# Patient Record
Sex: MALE | Race: WHITE | NOT HISPANIC OR LATINO | Employment: FULL TIME | URBAN - METROPOLITAN AREA
[De-identification: names, ages, dates, MRNs, and addresses within clinical notes are randomized per-mention and may not be internally consistent; named-entity substitution may affect disease eponyms.]

---

## 2023-02-10 ENCOUNTER — OFFICE VISIT (OUTPATIENT)
Dept: FAMILY MEDICINE | Facility: CLINIC | Age: 33
End: 2023-02-10
Payer: COMMERCIAL

## 2023-02-10 VITALS
TEMPERATURE: 98.1 F | BODY MASS INDEX: 27.98 KG/M2 | HEART RATE: 100 BPM | RESPIRATION RATE: 18 BRPM | WEIGHT: 225 LBS | OXYGEN SATURATION: 98 % | DIASTOLIC BLOOD PRESSURE: 72 MMHG | HEIGHT: 75 IN | SYSTOLIC BLOOD PRESSURE: 126 MMHG

## 2023-02-10 DIAGNOSIS — Z00.00 ROUTINE HISTORY AND PHYSICAL EXAMINATION OF ADULT: Primary | ICD-10-CM

## 2023-02-10 DIAGNOSIS — Z83.3 FAMILY HISTORY OF DIABETES MELLITUS: ICD-10-CM

## 2023-02-10 DIAGNOSIS — K13.79 LUMP IN MOUTH: ICD-10-CM

## 2023-02-10 PROBLEM — E66.9 OBESITY: Status: ACTIVE | Noted: 2023-02-10

## 2023-02-10 PROBLEM — J32.9 RECURRENT SINUSITIS: Status: ACTIVE | Noted: 2023-02-10

## 2023-02-10 LAB
ANION GAP SERPL CALCULATED.3IONS-SCNC: 10 MMOL/L (ref 7–15)
BUN SERPL-MCNC: 13.3 MG/DL (ref 6–20)
CALCIUM SERPL-MCNC: 9.7 MG/DL (ref 8.6–10)
CHLORIDE SERPL-SCNC: 103 MMOL/L (ref 98–107)
CHOLEST SERPL-MCNC: 169 MG/DL
CREAT SERPL-MCNC: 0.96 MG/DL (ref 0.67–1.17)
DEPRECATED HCO3 PLAS-SCNC: 27 MMOL/L (ref 22–29)
GFR SERPL CREATININE-BSD FRML MDRD: >90 ML/MIN/1.73M2
GLUCOSE SERPL-MCNC: 105 MG/DL (ref 70–99)
HBA1C MFR BLD: 5.1 % (ref 0–5.6)
HDLC SERPL-MCNC: 44 MG/DL
LDLC SERPL CALC-MCNC: 111 MG/DL
NONHDLC SERPL-MCNC: 125 MG/DL
POTASSIUM SERPL-SCNC: 4.9 MMOL/L (ref 3.4–5.3)
SODIUM SERPL-SCNC: 140 MMOL/L (ref 136–145)
TRIGL SERPL-MCNC: 68 MG/DL

## 2023-02-10 PROCEDURE — 80048 BASIC METABOLIC PNL TOTAL CA: CPT

## 2023-02-10 PROCEDURE — 80061 LIPID PANEL: CPT

## 2023-02-10 PROCEDURE — 36415 COLL VENOUS BLD VENIPUNCTURE: CPT

## 2023-02-10 PROCEDURE — 99203 OFFICE O/P NEW LOW 30 MIN: CPT | Mod: 25

## 2023-02-10 PROCEDURE — 83036 HEMOGLOBIN GLYCOSYLATED A1C: CPT

## 2023-02-10 PROCEDURE — 99385 PREV VISIT NEW AGE 18-39: CPT

## 2023-02-10 RX ORDER — FLUTICASONE PROPIONATE 50 MCG
1 SPRAY, SUSPENSION (ML) NASAL PRN
COMMUNITY

## 2023-02-10 ASSESSMENT — ENCOUNTER SYMPTOMS
COUGH: 0
EYE PAIN: 0
ARTHRALGIAS: 0
SHORTNESS OF BREATH: 0
ABDOMINAL PAIN: 0
WEAKNESS: 0
FREQUENCY: 0
NERVOUS/ANXIOUS: 1
PALPITATIONS: 0
CHILLS: 0
FEVER: 0
MYALGIAS: 0
HEMATURIA: 0
HEMATOCHEZIA: 0
HEADACHES: 0
DIARRHEA: 0
PARESTHESIAS: 0
CONSTIPATION: 0
DIZZINESS: 0
JOINT SWELLING: 0
HEARTBURN: 0
SORE THROAT: 0
DYSURIA: 0
NAUSEA: 0

## 2023-02-10 NOTE — PROGRESS NOTES
"   SUBJECTIVE:   CC: Sal is an 32 year old who presents for preventive health visit.     Patient has been advised of split billing requirements and indicates understanding: Yes  Krunal is a 32-year-old male ambulatory to clinic accompanied by self.  He is here for a physical and with concerns of lump under tongue. Lump under tongue for 1-2 years approx 3mm.  He did see a dentist for evaluation of the lump in [place], and was referred to oral surgery for biopsy.  His appointment is for end of February per his report.  No pain, no pus. No cigarettes, vape or chewing tobacco use, occasional cigar smoker. Leaves for [place] Monday. No pain, no swelling.   Had covid twice Aug 2021 and July 2022. Was able to treat at home during both illnesses.  Most bothersome symptom was severe sore throat. 8 months ago noticed he is occasionally unable to remember names, was more frequent at first but is less as time goes on.    in [place], active hiking, no chronic illness, does not take any medications. He has unilateral congenital absence of kidney.  He does have a history of an episode of hematuria that was evaluated with no known cause.  He denies any urinary concerns today.    Healthy Habits:     Getting at least 3 servings of Calcium per day:  Yes    Bi-annual eye exam:  NO    Dental care twice a year:  NO    Sleep apnea or symptoms of sleep apnea:  None    Diet:  Regular (no restrictions)    Frequency of exercise:  2-3 days/week    Duration of exercise:  30-45 minutes    Taking medications regularly:  Yes    Medication side effects:  None    PHQ-2 Total Score: 0    Additional concerns today:  Yes    Does endorse a \"sweet tooth\" and likes baked goods.     Onset: Unsure when he first noticed it.  Description: Lump on the floor of his mouth under tongue, unknown if it is benign.  Dentist advised he see an oral surgeon.  Just wants a quick look.      Today's PHQ-2 Score:   PHQ-2 ( 1999 [employer]) 2/10/2023   Q1: Little " "interest or pleasure in doing things 0   Q2: Feeling down, depressed or hopeless 0   PHQ-2 Score 0   Q1: Little interest or pleasure in doing things Not at all   Q2: Feeling down, depressed or hopeless Not at all   PHQ-2 Score 0       Social History     Tobacco Use     Smoking status: Never     Smokeless tobacco: Never   Substance Use Topics     Alcohol use: Not on file       Alcohol Use 2/10/2023   Prescreen: >3 drinks/day or >7 drinks/week? No       Reviewed orders with patient.  Reviewed health maintenance and updated orders accordingly - Yes  Lab work is in process  Recent Labs   Lab Test 03/10/15  1358   CR 0.84      Discussed testicular self exam. No concerns at this time.      Reviewed and updated as needed this visit by clinical staff   Tobacco  Allergies  Meds              Reviewed and updated as needed this visit by Provider                 Past Medical History:   Diagnosis Date     Hematuria      Unilateral congenital absence of kidney         Review of Systems   Constitutional: Negative for chills and fever.   HENT: Negative for congestion, ear pain, hearing loss and sore throat.    Eyes: Negative for pain and visual disturbance.   Respiratory: Negative for cough and shortness of breath.    Cardiovascular: Negative for chest pain, palpitations and peripheral edema.   Gastrointestinal: Negative for abdominal pain, constipation, diarrhea, heartburn, hematochezia and nausea.   Genitourinary: Negative for dysuria, frequency, genital sores, hematuria, impotence, penile discharge and urgency.   Musculoskeletal: Negative for arthralgias, joint swelling and myalgias.   Skin: Negative for rash.   Neurological: Negative for dizziness, weakness, headaches and paresthesias.   Psychiatric/Behavioral: Negative for mood changes. The patient is nervous/anxious.         OBJECTIVE:   /72   Pulse 100   Temp 98.1  F (36.7  C)   Resp 18   Ht 1.905 m (6' 3\")   Wt 102.1 kg (225 lb)   SpO2 98%   BMI 28.12 kg/m  "   Physical Exam  Constitutional:       Appearance: Normal appearance.   HENT:      Head: Normocephalic.      Right Ear: Tympanic membrane normal.      Left Ear: Tympanic membrane normal.      Nose: Nose normal.      Mouth/Throat:      Mouth: Mucous membranes are moist.      Pharynx: Oropharynx is clear.      Comments: Small, approx 3mm lump under tongue on left side proximal to gum line. Lump is moveable, non-tender, no erythema or pus.   Eyes:      Extraocular Movements: Extraocular movements intact.      Conjunctiva/sclera: Conjunctivae normal.      Pupils: Pupils are equal, round, and reactive to light.   Cardiovascular:      Rate and Rhythm: Normal rate and regular rhythm.      Heart sounds: Normal heart sounds.   Pulmonary:      Effort: Pulmonary effort is normal.      Breath sounds: Normal breath sounds.   Abdominal:      General: Abdomen is flat. Bowel sounds are normal.      Palpations: Abdomen is soft. There is no mass.      Tenderness: There is no abdominal tenderness.   Musculoskeletal:      Cervical back: Normal range of motion and neck supple. No tenderness.   Lymphadenopathy:      Cervical: No cervical adenopathy.   Skin:     General: Skin is warm and dry.   Neurological:      General: No focal deficit present.      Mental Status: He is alert and oriented to person, place, and time.   Psychiatric:         Mood and Affect: Mood normal.         Behavior: Behavior normal.         Diagnostic Test Results:  Labs reviewed in Epic    ASSESSMENT/PLAN:       ICD-10-CM    1. Routine history and physical examination of adult  Z00.00 Basic metabolic panel  (Ca, Cl, CO2, Creat, Gluc, K, Na, BUN)     Hemoglobin A1c     Lipid Profile (Chol, Trig, HDL, LDL calc)     Basic metabolic panel  (Ca, Cl, CO2, Creat, Gluc, K, Na, BUN)     Hemoglobin A1c     Lipid Profile (Chol, Trig, HDL, LDL calc)      2. Lump in mouth  K13.79  Instructed to keep appointment with oral surgery.  Advised to follow-up if changes in lump    , Or  signs of infection develop.      Patient will be notified of results, MyChart sign up sent to patient, advised if I do not see MyChart active I will call him with results.  Recommend follow-up in 1 year for routine annual physical, and will adjust plan of care as needed for any abnormal lab results.    Discussion of occasional difficulty remembering names discussed.  Patient advised if symptoms do not continue to improve that we could look at further neurological evaluation, and if symptoms worsen to follow-up sooner.    Occasional knee pain with descent while hiking discussed, recommendation for stretching exercises before physical activity and strengthening exercises for the knees discussed.    Discussed seasonal allergies, he reports good relief with Flonase.  Also recommended he could try cetirizine on a daily basis during times when his seasonal allergies are active.    Patient declines influenza vaccine today, reports received COVID vaccination at outside location.  Declines any STI testing today.    Patient has been advised of split billing requirements and indicates understanding: Yes      COUNSELING:  Reviewed preventive health counseling, as reflected in patient instructions       Regular exercise       Healthy diet/nutrition       Colorectal Cancer Screening    Discussed minimizing sugar in diet, familial history of diabetes.     He reports that he has never smoked. He has never used smokeless tobacco.          SREEDHAR Ornelas CNP  [facility]

## 2023-02-10 NOTE — PATIENT INSTRUCTIONS
My try cetirizine (zyrtec)) for seasonal allergies.    I will call with or message via [employer] with your results and adjust plan of care as needed.

## 2023-04-23 ENCOUNTER — HEALTH MAINTENANCE LETTER (OUTPATIENT)
Age: 33
End: 2023-04-23

## 2024-06-29 ENCOUNTER — HEALTH MAINTENANCE LETTER (OUTPATIENT)
Age: 34
End: 2024-06-29

## 2025-07-13 ENCOUNTER — HEALTH MAINTENANCE LETTER (OUTPATIENT)
Age: 35
End: 2025-07-13